# Patient Record
Sex: MALE | URBAN - NONMETROPOLITAN AREA
[De-identification: names, ages, dates, MRNs, and addresses within clinical notes are randomized per-mention and may not be internally consistent; named-entity substitution may affect disease eponyms.]

---

## 2023-08-16 ENCOUNTER — TELEPHONE (OUTPATIENT)
Dept: INTERNAL MEDICINE | Age: 83
End: 2023-08-16

## 2023-08-17 ENCOUNTER — OUTSIDE SERVICES (OUTPATIENT)
Dept: INTERNAL MEDICINE | Age: 83
End: 2023-08-17

## 2023-08-17 DIAGNOSIS — I48.91 ATRIAL FIBRILLATION, UNSPECIFIED TYPE (HCC): ICD-10-CM

## 2023-08-17 DIAGNOSIS — E78.5 HYPERLIPIDEMIA, UNSPECIFIED HYPERLIPIDEMIA TYPE: ICD-10-CM

## 2023-08-17 DIAGNOSIS — F03.A0 MILD DEMENTIA WITHOUT BEHAVIORAL DISTURBANCE, PSYCHOTIC DISTURBANCE, MOOD DISTURBANCE, OR ANXIETY, UNSPECIFIED DEMENTIA TYPE (HCC): ICD-10-CM

## 2023-08-17 DIAGNOSIS — I25.10 CORONARY ARTERY DISEASE INVOLVING NATIVE HEART WITHOUT ANGINA PECTORIS, UNSPECIFIED VESSEL OR LESION TYPE: ICD-10-CM

## 2023-08-17 DIAGNOSIS — I10 HYPERTENSION, UNSPECIFIED TYPE: ICD-10-CM

## 2023-08-17 DIAGNOSIS — K57.92 DIVERTICULITIS: Primary | ICD-10-CM

## 2023-08-17 DIAGNOSIS — M19.90 ARTHRITIS: ICD-10-CM

## 2023-08-17 DIAGNOSIS — J41.0 SIMPLE CHRONIC BRONCHITIS (HCC): ICD-10-CM

## 2023-08-17 DIAGNOSIS — F32.A DEPRESSION, UNSPECIFIED DEPRESSION TYPE: ICD-10-CM

## 2023-08-17 ASSESSMENT — ENCOUNTER SYMPTOMS
WHEEZING: 0
COLOR CHANGE: 0
EYE PAIN: 0
DIARRHEA: 0
CONSTIPATION: 0
VOMITING: 0
SINUS PRESSURE: 0
CHEST TIGHTNESS: 0
RHINORRHEA: 0
FACIAL SWELLING: 0
TROUBLE SWALLOWING: 0
BLOOD IN STOOL: 0
SORE THROAT: 0
COUGH: 0
SHORTNESS OF BREATH: 0
ABDOMINAL PAIN: 0
NAUSEA: 0

## 2023-08-18 NOTE — PROGRESS NOTES
08/17/23  Keanupadmini Silva  1940    Patient Resident of [facility]    Chief Complaint  1. Diverticulitis    2. Hypertension, unspecified type    3. Coronary artery disease involving native heart without angina pectoris, unspecified vessel or lesion type    4. Hyperlipidemia, unspecified hyperlipidemia type    5. Depression, unspecified depression type    6. Simple chronic bronchitis ([address])    7. Arthritis    8. Atrial fibrillation, unspecified type ([address])    9. Mild dementia without behavioral disturbance, psychotic disturbance, mood disturbance, or anxiety, unspecified dementia type [facility])        HPI:  80year-old patient with recent hospitalization at [facility] 8/4/2023 through 8/16/2023 through duodenal diverticulitis. Patient's primary PCP is Tamy Evans through [facility] in [facility]. Patient states he lives in [place] with his granddaughter. States he was transferred to [facility] after hospitalization so he could be closer to his daughter that lives here in [facility]. He states he is unsure why he is at the [facility]. States prior to his hospitalization he was getting up moving around and walking on his own. He does have a chronic dual indwelling Webber catheter due to BPH. Follows with Dr. Rita Dobson urology in [place]. States he has been taking care of that on his own. He denies any pain no shortness of breath. No family present today. Nursing staff state he has been doing well. Review of chart is a DNR. He feels he is doing well mood has been stable history of A-fib continues on Xarelto daily. Continues on atenolol. Regular rate and rhythm on exam.  Feels his mood has been stable on the Zoloft. He denies any diarrhea no nausea no abdominal pain. Ate breakfast today without difficulty. Nursing staff deny any acute nursing service issues      Allergies   Allergen Reactions    Codeine     Keflex [Cephalexin]     Lisinopril     Penicillins        No past medical history on file.     No

## 2023-08-23 ENCOUNTER — HOSPITAL ENCOUNTER (OUTPATIENT)
Age: 83
Setting detail: SPECIMEN
Discharge: HOME | End: 2023-08-23

## 2023-08-23 LAB
ANION GAP SERPL CALCULATED.3IONS-SCNC: 8 MMOL/L (ref 9–17)
BUN SERPL-MCNC: 8 MG/DL (ref 8–23)
BUN/CREAT SERPL: 9 (ref 9–20)
CALCIUM SERPL-MCNC: 8.7 MG/DL (ref 8.6–10.4)
CHLORIDE SERPL-SCNC: 106 MMOL/L (ref 98–107)
CO2 SERPL-SCNC: 24 MMOL/L (ref 20–31)
CREAT SERPL-MCNC: 0.9 MG/DL (ref 0.7–1.2)
ERYTHROCYTE [DISTWIDTH] IN BLOOD BY AUTOMATED COUNT: 13.3 % (ref 11.8–14.4)
GFR SERPL CREATININE-BSD FRML MDRD: >60 ML/MIN/1.73M2
GLUCOSE SERPL-MCNC: 94 MG/DL (ref 70–99)
HCT VFR BLD AUTO: 38.9 % (ref 40.7–50.3)
HGB BLD-MCNC: 13.1 G/DL (ref 13–17)
MCH RBC QN AUTO: 32.7 PG (ref 25.2–33.5)
MCHC RBC AUTO-ENTMCNC: 33.7 G/DL (ref 25.2–33.5)
MCV RBC AUTO: 97 FL (ref 82.6–102.9)
NRBC BLD-RTO: 0 PER 100 WBC
PLATELET # BLD AUTO: 248 K/UL (ref 138–453)
PMV BLD AUTO: 10.8 FL (ref 8.1–13.5)
POTASSIUM SERPL-SCNC: 4.2 MMOL/L (ref 3.7–5.3)
RBC # BLD AUTO: 4.01 M/UL (ref 4.21–5.77)
SODIUM SERPL-SCNC: 138 MMOL/L (ref 135–144)
WBC OTHER # BLD: 5.3 K/UL (ref 3.5–11.3)

## 2023-08-23 PROCEDURE — 80048 BASIC METABOLIC PNL TOTAL CA: CPT

## 2023-08-23 PROCEDURE — 36415 COLL VENOUS BLD VENIPUNCTURE: CPT

## 2023-08-23 PROCEDURE — 85027 COMPLETE CBC AUTOMATED: CPT

## 2023-08-27 PROCEDURE — 99305 1ST NF CARE MODERATE MDM 35: CPT | Performed by: INTERNAL MEDICINE

## 2023-08-30 ENCOUNTER — OUTSIDE SERVICES (OUTPATIENT)
Dept: INTERNAL MEDICINE | Age: 83
End: 2023-08-30
Payer: MEDICARE

## 2023-08-30 DIAGNOSIS — I25.10 CORONARY ARTERY DISEASE INVOLVING NATIVE HEART WITHOUT ANGINA PECTORIS, UNSPECIFIED VESSEL OR LESION TYPE: ICD-10-CM

## 2023-08-30 DIAGNOSIS — E78.5 HYPERLIPIDEMIA, UNSPECIFIED HYPERLIPIDEMIA TYPE: ICD-10-CM

## 2023-08-30 DIAGNOSIS — I48.91 ATRIAL FIBRILLATION, UNSPECIFIED TYPE (HCC): ICD-10-CM

## 2023-08-30 DIAGNOSIS — F03.A0 MILD DEMENTIA WITHOUT BEHAVIORAL DISTURBANCE, PSYCHOTIC DISTURBANCE, MOOD DISTURBANCE, OR ANXIETY, UNSPECIFIED DEMENTIA TYPE (HCC): ICD-10-CM

## 2023-08-30 DIAGNOSIS — I10 HYPERTENSION, UNSPECIFIED TYPE: ICD-10-CM

## 2023-08-30 DIAGNOSIS — K57.92 DIVERTICULITIS: Primary | ICD-10-CM

## 2023-08-30 PROCEDURE — 99315 NF DSCHRG MGMT 30 MIN/LESS: CPT | Performed by: NURSE PRACTITIONER

## 2023-08-30 RX ORDER — LEVOFLOXACIN 750 MG/1
750 TABLET ORAL DAILY
Qty: 6 TABLET | Refills: 0 | Status: SHIPPED | OUTPATIENT
Start: 2023-08-30 | End: 2023-09-05

## 2023-08-30 NOTE — TELEPHONE ENCOUNTER
Francheska is requesting Levaquin 750 mg daily for 6 days to [facility]. He will be 6 days short on his treatment unless this is done. Pended for you.

## 2023-08-31 NOTE — PROGRESS NOTES
08/30/23  Alec Lopez  1940    Patient Resident of [facility]    Chief Complaint  1. Diverticulitis    2. Hypertension, unspecified type    3. Coronary artery disease involving native heart without angina pectoris, unspecified vessel or lesion type    4. Hyperlipidemia, unspecified hyperlipidemia type    5. Atrial fibrillation, unspecified type ([address])    6. Mild dementia without behavioral disturbance, psychotic disturbance, mood disturbance, or anxiety, unspecified dementia type [facility])        HPI:  80year-old patient with recent hospitalization at [facility] 8/4/2023 through 8/16/2023 through duodenal diverticulitis. Patient's primary PCP is Avelina Rizvi through [employer] in [facility]. Patient states he lives in [place] with his granddaughter. States he was transferred to [facility] after hospitalization so he could be closer to his daughter that lives here in [place]. He states he is unsure why he is at the [facility]. States prior to his hospitalization he was getting up moving around and walking on his own. He does have a chronic dual indwelling Webber catheter due to BPH. Follows with Dr. Demetrius Piedra urology in [facility]    In the interim. Patient has done well with physical therapy and plans are for him to be discharging back home with his granddaughter on Monday. His vitals have been stable he has had a good appetite. Nursing staff deny any nursing service issues regarding discharging home. Social service states he will need walker. Allergies   Allergen Reactions    Codeine     Keflex [Cephalexin]     Lisinopril     Penicillins        No past medical history on file. No past surgical history on file.     Medications as per [facility] Chart /reviewed     Social History     Socioeconomic History    Marital status: Unknown     Spouse name: Not on file    Number of children: Not on file    Years of education: Not on file    Highest education level: Not on file   Occupational

## 2023-09-04 ASSESSMENT — ENCOUNTER SYMPTOMS
TROUBLE SWALLOWING: 0
COUGH: 0
BLOOD IN STOOL: 0
WHEEZING: 0
DIARRHEA: 0
SINUS PRESSURE: 0
CHEST TIGHTNESS: 0
FACIAL SWELLING: 0
EYE PAIN: 0
RHINORRHEA: 0
CONSTIPATION: 0
SHORTNESS OF BREATH: 0
SORE THROAT: 0
ABDOMINAL PAIN: 0
NAUSEA: 0
COLOR CHANGE: 0
VOMITING: 0

## 2023-11-15 ENCOUNTER — OUTSIDE SERVICES (OUTPATIENT)
Dept: INTERNAL MEDICINE | Age: 83
End: 2023-11-15
Payer: MEDICARE

## 2023-11-15 DIAGNOSIS — I48.0 PAROXYSMAL ATRIAL FIBRILLATION (HCC): ICD-10-CM

## 2023-11-15 DIAGNOSIS — E78.2 MIXED HYPERLIPIDEMIA: ICD-10-CM

## 2023-11-15 DIAGNOSIS — I10 ESSENTIAL HYPERTENSION: ICD-10-CM

## 2023-11-15 DIAGNOSIS — K57.00 PERFORATED DIVERTICULUM OF DUODENUM: ICD-10-CM

## 2023-11-15 DIAGNOSIS — I25.10 CORONARY ARTERY DISEASE INVOLVING NATIVE HEART WITHOUT ANGINA PECTORIS, UNSPECIFIED VESSEL OR LESION TYPE: ICD-10-CM

## 2023-11-15 DIAGNOSIS — F03.A0 MILD DEMENTIA WITHOUT BEHAVIORAL DISTURBANCE, PSYCHOTIC DISTURBANCE, MOOD DISTURBANCE, OR ANXIETY, UNSPECIFIED DEMENTIA TYPE (HCC): ICD-10-CM

## 2023-11-15 DIAGNOSIS — N40.1 BENIGN PROSTATIC HYPERPLASIA WITH URINARY OBSTRUCTION: ICD-10-CM

## 2023-11-15 DIAGNOSIS — F32.5 MAJOR DEPRESSIVE DISORDER WITH SINGLE EPISODE, IN REMISSION (HCC): ICD-10-CM

## 2023-11-15 DIAGNOSIS — K21.9 GASTROESOPHAGEAL REFLUX DISEASE WITHOUT ESOPHAGITIS: ICD-10-CM

## 2023-11-15 DIAGNOSIS — N13.8 BENIGN PROSTATIC HYPERPLASIA WITH URINARY OBSTRUCTION: ICD-10-CM

## 2023-11-15 DIAGNOSIS — K57.92 DIVERTICULITIS: Primary | ICD-10-CM

## 2023-11-15 DIAGNOSIS — J41.0 SIMPLE CHRONIC BRONCHITIS (HCC): ICD-10-CM

## 2023-11-15 PROBLEM — N21.0 BLADDER STONE: Status: ACTIVE | Noted: 2022-09-12

## 2023-11-15 PROBLEM — M15.0 PRIMARY OSTEOARTHRITIS INVOLVING MULTIPLE JOINTS: Status: ACTIVE | Noted: 2023-11-15

## 2023-11-15 PROBLEM — R33.9 RETENTION OF URINE: Status: ACTIVE | Noted: 2021-01-14

## 2023-11-15 PROBLEM — M25.559 HIP PAIN: Status: ACTIVE | Noted: 2019-11-09

## 2023-11-15 PROBLEM — R10.9 ACUTE ABDOMINAL PAIN: Status: RESOLVED | Noted: 2023-09-05 | Resolved: 2023-11-15

## 2023-11-15 PROBLEM — R97.20 ELEVATED PROSTATE SPECIFIC ANTIGEN (PSA): Status: ACTIVE | Noted: 2021-01-14

## 2023-11-15 PROBLEM — R33.9 RETENTION OF URINE: Status: RESOLVED | Noted: 2021-01-14 | Resolved: 2023-11-15

## 2023-11-15 PROBLEM — R06.09 DYSPNEA ON EXERTION: Status: ACTIVE | Noted: 2022-08-05

## 2023-11-15 PROBLEM — R10.9 ACUTE ABDOMINAL PAIN: Status: ACTIVE | Noted: 2023-09-05

## 2023-11-15 PROBLEM — M15.9 PRIMARY OSTEOARTHRITIS INVOLVING MULTIPLE JOINTS: Status: ACTIVE | Noted: 2023-11-15

## 2023-11-15 PROBLEM — K63.1 BOWEL PERFORATION (HCC): Status: RESOLVED | Noted: 2023-08-04 | Resolved: 2023-11-15
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
## 2023-11-15 PROBLEM — N43.40 SPERMATOCELE: Status: ACTIVE | Noted: 2021-01-14

## 2023-11-15 PROBLEM — K31.6 DUODENAL FISTULA: Status: ACTIVE | Noted: 2023-09-21

## 2023-11-15 PROBLEM — R06.09 DYSPNEA ON EXERTION: Status: RESOLVED | Noted: 2022-08-05 | Resolved: 2023-11-15

## 2023-11-15 PROBLEM — K86.2 PANCREATIC CYST: Status: ACTIVE | Noted: 2023-09-21

## 2023-11-15 PROBLEM — I48.91 ATRIAL FIBRILLATION (HCC): Status: ACTIVE | Noted: 2017-04-28

## 2023-11-15 PROBLEM — N21.0 BLADDER STONE: Status: RESOLVED | Noted: 2022-09-12 | Resolved: 2023-11-15

## 2023-11-15 PROBLEM — K63.1 BOWEL PERFORATION (HCC): Status: ACTIVE | Noted: 2023-08-04

## 2023-11-15 RX ORDER — SERTRALINE HYDROCHLORIDE 100 MG/1
100 TABLET, FILM COATED ORAL DAILY
COMMUNITY

## 2023-11-15 RX ORDER — DONEPEZIL HYDROCHLORIDE 10 MG/1
10 TABLET, FILM COATED ORAL NIGHTLY
COMMUNITY

## 2023-11-15 RX ORDER — MIRTAZAPINE 15 MG/1
15 TABLET, FILM COATED ORAL NIGHTLY
COMMUNITY

## 2023-11-15 RX ORDER — FINASTERIDE 5 MG/1
5 TABLET, FILM COATED ORAL DAILY
Qty: 30 TABLET | Refills: 5 | COMMUNITY
Start: 2023-09-01 | End: 2024-02-28

## 2023-11-15 RX ORDER — ATENOLOL 50 MG/1
50 TABLET ORAL DAILY
COMMUNITY

## 2023-11-15 RX ORDER — SIMVASTATIN 20 MG
20 TABLET ORAL NIGHTLY
COMMUNITY
Start: 2022-09-14

## 2023-11-15 RX ORDER — POTASSIUM CHLORIDE 20 MEQ/1
10 TABLET, EXTENDED RELEASE ORAL DAILY
COMMUNITY

## 2023-11-15 RX ORDER — PANTOPRAZOLE SODIUM 40 MG/1
40 TABLET, DELAYED RELEASE ORAL DAILY
Qty: 30 TABLET | Refills: 2 | COMMUNITY
Start: 2023-09-21 | End: 2023-12-20

## 2023-11-16 NOTE — PROGRESS NOTES
DR. Adiel Rice - [facility] NEW PATIENT HISTORY & PHYSICAL EXAM    DATE OF SERVICE: 8/27/23    NURSING HOME: [facility]    CHRONIC/ACTIVE PROBLEM LIST:     Patient Active Problem List   Diagnosis    Atrial fibrillation ([address])    Benign essential hypertension    Benign prostatic hyperplasia with urinary obstruction    Duodenal fistula    Elevated prostate specific antigen (PSA)    Extrinsic asthma    Hip pain    Hyperlipidemia    Major depressive disorder with single episode    Other seborrheic keratosis    Pancreatic cyst    Perforated diverticulum of duodenum    Spermatocele    Diverticulitis    Gastroesophageal reflux disease without esophagitis    Primary osteoarthritis involving multiple joints       CHIEF COMPLAINT: Here for rehabilitation and physical therapy    HISTORY OF CHIEF COMPLAINT: This 80 y.o.  male was admitted to the [address] [place] [address] for rehabilitation and physical therapy following recent hospitalization at [facility] for diverticulitis with perforation. Following the hospitalization he was admitted to the nursing home and he has been getting physical therapy. He seems to be doing fairly well with that. He does have a history of atrial fibrillation, for which she is taking Xarelto. He also has dementia, and takes Aricept for that. He uses atenolol for hypertension. He takes Zocor for hyperlipidemia. He is on Protonix for gastroesophageal reflux disease. He takes Zoloft and Remeron for a history of depression. There are no other complaints at this time. ALLERGIES:   Allergies   Allergen Reactions    Codeine     Keflex [Cephalexin]     Lisinopril     Penicillins     Povidone Iodine        MEDICATIONS: As noted on the [facility], referenced and incorporated herein.     PAST MEDICAL HISTORY:   Past Medical History:   Diagnosis Date    Acute abdominal pain 9/5/2023    Atrial fibrillation ([address]) 4/28/2017    Last Assessment & Plan:

## 2024-03-25 ENCOUNTER — HOSPITAL ENCOUNTER (OUTPATIENT)
Age: 84
Setting detail: SPECIMEN
Discharge: HOME | End: 2024-03-25

## 2024-03-25 ENCOUNTER — OUTSIDE SERVICES (OUTPATIENT)
Dept: INTERNAL MEDICINE | Age: 84
End: 2024-03-25
Payer: MEDICARE

## 2024-03-25 DIAGNOSIS — N30.00 ACUTE CYSTITIS WITHOUT HEMATURIA: Primary | ICD-10-CM

## 2024-03-25 DIAGNOSIS — N40.1 BENIGN PROSTATIC HYPERPLASIA WITH URINARY OBSTRUCTION: ICD-10-CM

## 2024-03-25 DIAGNOSIS — R33.9 URINARY RETENTION: ICD-10-CM

## 2024-03-25 DIAGNOSIS — R53.1 GENERAL WEAKNESS: ICD-10-CM

## 2024-03-25 DIAGNOSIS — N13.8 BENIGN PROSTATIC HYPERPLASIA WITH URINARY OBSTRUCTION: ICD-10-CM

## 2024-03-25 DIAGNOSIS — I48.0 PAROXYSMAL ATRIAL FIBRILLATION (HCC): ICD-10-CM

## 2024-03-25 DIAGNOSIS — I10 BENIGN ESSENTIAL HYPERTENSION: ICD-10-CM

## 2024-03-25 LAB
ANION GAP SERPL CALCULATED.3IONS-SCNC: 10 MMOL/L (ref 9–17)
BASOPHILS # BLD: 0.06 K/UL (ref 0–0.2)
BASOPHILS NFR BLD: 1 % (ref 0–2)
BUN SERPL-MCNC: 10 MG/DL (ref 8–23)
BUN/CREAT SERPL: 13 (ref 9–20)
CALCIUM SERPL-MCNC: 8.5 MG/DL (ref 8.6–10.4)
CHLORIDE SERPL-SCNC: 106 MMOL/L (ref 98–107)
CO2 SERPL-SCNC: 23 MMOL/L (ref 20–31)
CREAT SERPL-MCNC: 0.8 MG/DL (ref 0.7–1.2)
EOSINOPHIL # BLD: 0.15 K/UL (ref 0–0.44)
EOSINOPHILS RELATIVE PERCENT: 3 % (ref 1–4)
ERYTHROCYTE [DISTWIDTH] IN BLOOD BY AUTOMATED COUNT: 12.9 % (ref 11.8–14.4)
GFR SERPL CREATININE-BSD FRML MDRD: >60 ML/MIN/1.73M2
GLUCOSE SERPL-MCNC: 85 MG/DL (ref 70–99)
HCT VFR BLD AUTO: 38.7 % (ref 40.7–50.3)
HGB BLD-MCNC: 13.2 G/DL (ref 13–17)
IMM GRANULOCYTES # BLD AUTO: <0.03 K/UL (ref 0–0.3)
IMM GRANULOCYTES NFR BLD: 0 %
LYMPHOCYTES NFR BLD: 0.76 K/UL (ref 1.1–3.7)
LYMPHOCYTES RELATIVE PERCENT: 15 % (ref 24–43)
MCH RBC QN AUTO: 32.7 PG (ref 25.2–33.5)
MCHC RBC AUTO-ENTMCNC: 34.1 G/DL (ref 25.2–33.5)
MCV RBC AUTO: 95.8 FL (ref 82.6–102.9)
MONOCYTES NFR BLD: 0.46 K/UL (ref 0.1–1.2)
MONOCYTES NFR BLD: 9 % (ref 3–12)
NEUTROPHILS NFR BLD: 72 % (ref 36–65)
NEUTS SEG NFR BLD: 3.71 K/UL (ref 1.5–8.1)
NRBC BLD-RTO: 0 PER 100 WBC
PLATELET # BLD AUTO: 312 K/UL (ref 138–453)
PMV BLD AUTO: 10.1 FL (ref 8.1–13.5)
POTASSIUM SERPL-SCNC: 4.3 MMOL/L (ref 3.7–5.3)
RBC # BLD AUTO: 4.04 M/UL (ref 4.21–5.77)
SODIUM SERPL-SCNC: 139 MMOL/L (ref 135–144)
WBC OTHER # BLD: 5.2 K/UL (ref 3.5–11.3)

## 2024-03-25 PROCEDURE — 80048 BASIC METABOLIC PNL TOTAL CA: CPT

## 2024-03-25 PROCEDURE — 85025 COMPLETE CBC W/AUTO DIFF WBC: CPT

## 2024-03-25 PROCEDURE — 36415 COLL VENOUS BLD VENIPUNCTURE: CPT

## 2024-03-25 PROCEDURE — 99309 SBSQ NF CARE MODERATE MDM 30: CPT | Performed by: NURSE PRACTITIONER

## 2024-03-26 ASSESSMENT — ENCOUNTER SYMPTOMS
CHEST TIGHTNESS: 0
NAUSEA: 0
SORE THROAT: 0
COUGH: 0
ABDOMINAL PAIN: 0
TROUBLE SWALLOWING: 0
COLOR CHANGE: 0
RHINORRHEA: 0
SHORTNESS OF BREATH: 0
CONSTIPATION: 0
VOMITING: 0
EYE PAIN: 0
WHEEZING: 0
DIARRHEA: 0
SINUS PRESSURE: 0
BLOOD IN STOOL: 0
FACIAL SWELLING: 0

## 2024-03-26 NOTE — PROGRESS NOTES
03/25/24  Cy Osmun  1940    Patient Resident of [facility]    Chief Complaint  1. Acute cystitis without hematuria    2. Urinary retention    3. Benign prostatic hyperplasia with urinary obstruction    4. Benign essential hypertension    5. Paroxysmal atrial fibrillation (HCC)    6. General weakness        HPI:  83-year-old patient with recent hospitalization at [facility] where he was admitted 3/14/2024 for UTI, urinary retention, dark stools, history of A-fib on Xarelto.  Fall, inability to care for himself in the home setting.  Patient was treated with antibiotics, suggested nursing home for further skilled therapy.  Initially had abdominal pain.  That resolved.  CT of abdomen pelvis showed catheter present with balloon distended into the prostate.  Webber catheter was repositioned.  No further concerns of GI bleed.  Webber catheter was exchanged.  Working properly.  Has follow-up with urology for monthly exchanges.  CT of abdomen showed severe jejunitis GI was consulted recommended conservative measures.  Eliquis was held during hospital stay however has been resumed.  Was subsequently discharged to [facility] on 3/19/2024 for further eval and treatment by PT OT.  Discharged on levofloxacin for completion of 7-day course.  Xarelto restarted.  No further dark stools.  No change in urine.  Vitals have been stable.  Patient pleasant on exam.  Declines any acute concerns.  Nursing staff deny any acute concerns.      Allergies   Allergen Reactions    Codeine     Keflex [Cephalexin]     Lisinopril     Penicillins     Povidone Iodine        Past Medical History:   Diagnosis Date    Acute abdominal pain 9/5/2023    Atrial fibrillation (HCC) 4/28/2017    Last Assessment & Plan:  Formatting of this note might be different from the original. 11/9/2019 History of paroxysmal A.Fib. On Coumadin. Did not take his dose on 11/8/19. INR 1.7.  -Will hold home Coumadin for now pending MRI results. -If MRI

## 2024-03-29 ENCOUNTER — HOSPITAL ENCOUNTER (OUTPATIENT)
Age: 84
Setting detail: SPECIMEN
Discharge: HOME | End: 2024-03-29

## 2024-03-29 LAB
ANION GAP SERPL CALCULATED.3IONS-SCNC: 8 MMOL/L (ref 9–17)
BUN SERPL-MCNC: 12 MG/DL (ref 8–23)
BUN/CREAT SERPL: 13 (ref 9–20)
CALCIUM SERPL-MCNC: 8.4 MG/DL (ref 8.6–10.4)
CHLORIDE SERPL-SCNC: 107 MMOL/L (ref 98–107)
CO2 SERPL-SCNC: 24 MMOL/L (ref 20–31)
CREAT SERPL-MCNC: 0.9 MG/DL (ref 0.7–1.2)
ERYTHROCYTE [DISTWIDTH] IN BLOOD BY AUTOMATED COUNT: 13.2 % (ref 11.8–14.4)
GFR SERPL CREATININE-BSD FRML MDRD: 85 ML/MIN/1.73M2
GLUCOSE SERPL-MCNC: 81 MG/DL (ref 70–99)
HCT VFR BLD AUTO: 40 % (ref 40.7–50.3)
HGB BLD-MCNC: 13.8 G/DL (ref 13–17)
MCH RBC QN AUTO: 32.8 PG (ref 25.2–33.5)
MCHC RBC AUTO-ENTMCNC: 34.5 G/DL (ref 25.2–33.5)
MCV RBC AUTO: 95 FL (ref 82.6–102.9)
NRBC BLD-RTO: 0 PER 100 WBC
PLATELET # BLD AUTO: 297 K/UL (ref 138–453)
PMV BLD AUTO: 10.3 FL (ref 8.1–13.5)
POTASSIUM SERPL-SCNC: 4 MMOL/L (ref 3.7–5.3)
RBC # BLD AUTO: 4.21 M/UL (ref 4.21–5.77)
SODIUM SERPL-SCNC: 139 MMOL/L (ref 135–144)
WBC OTHER # BLD: 4.8 K/UL (ref 3.5–11.3)

## 2024-03-29 PROCEDURE — 80048 BASIC METABOLIC PNL TOTAL CA: CPT

## 2024-03-29 PROCEDURE — 36415 COLL VENOUS BLD VENIPUNCTURE: CPT

## 2024-03-29 PROCEDURE — 85027 COMPLETE CBC AUTOMATED: CPT

## 2024-04-01 ENCOUNTER — OUTSIDE SERVICES (OUTPATIENT)
Dept: INTERNAL MEDICINE | Age: 84
End: 2024-04-01
Payer: MEDICARE

## 2024-04-01 DIAGNOSIS — N30.00 ACUTE CYSTITIS WITHOUT HEMATURIA: Primary | ICD-10-CM

## 2024-04-01 DIAGNOSIS — I48.0 PAROXYSMAL ATRIAL FIBRILLATION (HCC): ICD-10-CM

## 2024-04-01 DIAGNOSIS — R53.1 GENERAL WEAKNESS: ICD-10-CM

## 2024-04-01 DIAGNOSIS — F03.A0 MILD DEMENTIA WITHOUT BEHAVIORAL DISTURBANCE, PSYCHOTIC DISTURBANCE, MOOD DISTURBANCE, OR ANXIETY, UNSPECIFIED DEMENTIA TYPE (HCC): ICD-10-CM

## 2024-04-01 DIAGNOSIS — I10 BENIGN ESSENTIAL HYPERTENSION: ICD-10-CM

## 2024-04-01 DIAGNOSIS — I25.10 CORONARY ARTERY DISEASE INVOLVING NATIVE HEART WITHOUT ANGINA PECTORIS, UNSPECIFIED VESSEL OR LESION TYPE: ICD-10-CM

## 2024-04-01 DIAGNOSIS — R33.9 URINARY RETENTION: ICD-10-CM

## 2024-04-01 PROCEDURE — 99315 NF DSCHRG MGMT 30 MIN/LESS: CPT | Performed by: NURSE PRACTITIONER

## 2024-04-01 ASSESSMENT — ENCOUNTER SYMPTOMS
VOMITING: 0
RHINORRHEA: 0
WHEEZING: 0
EYE PAIN: 0
CHEST TIGHTNESS: 0
SINUS PRESSURE: 0
DIARRHEA: 0
SORE THROAT: 0
BLOOD IN STOOL: 0
COUGH: 0
COLOR CHANGE: 0
CONSTIPATION: 0
NAUSEA: 0
TROUBLE SWALLOWING: 0
SHORTNESS OF BREATH: 0
ABDOMINAL PAIN: 0
FACIAL SWELLING: 0

## 2024-04-01 NOTE — PROGRESS NOTES
04/01/24  Cy Osmun  1940    Patient Resident of [facility]    Chief Complaint  1. Acute cystitis without hematuria    2. Urinary retention    3. Benign essential hypertension    4. Paroxysmal atrial fibrillation (HCC)    5. General weakness    6. Coronary artery disease involving native heart without angina pectoris, unspecified vessel or lesion type    7. Mild dementia without behavioral disturbance, psychotic disturbance, mood disturbance, or anxiety, unspecified dementia type (HCC)        HPI:  83-year-old patient with recent hospitalization at [facility] where he was admitted 3/14/2024 for UTI, urinary retention, dark stools, history of A-fib on Xarelto. Fall, inability to care for himself in the home setting. Patient was treated with antibiotics, suggested nursing home for further skilled therapy. Initially had abdominal pain. That resolved. CT of abdomen pelvis showed catheter present with balloon distended into the prostate. Webber catheter was repositioned. No further concerns of GI bleed. Webber catheter was exchanged. Working properly. Has follow-up with urology for monthly exchanges. CT of abdomen showed severe jejunitis GI was consulted recommended conservative measures. Eliquis was held during hospital stay however has been resumed. Was subsequently discharged to [facility] on 3/19/2024 for further eval and treatment by PT OT. Discharged on levofloxacin for completion of 7-day course. Xarelto restarted. No further dark stools. No change in urine.     In the interim  Patient being seen for discharge needs as insurance cut him.  States he appealed however they denied the appeal.  Planning on being discharged home with home health.  Is followed by his granddaughter.  He states he is up ambulating with physical therapy.  Feeling stronger.  Is ready to go home.  He denies any problems with bowels.  States he had a good normal BM this morning.  Does continue with a Webber catheter which

## 2025-07-17 PROBLEM — T83.511A URINARY TRACT INFECTION ASSOCIATED WITH INDWELLING URETHRAL CATHETER: Status: ACTIVE | Noted: 2024-03-14

## 2025-07-17 PROBLEM — N31.9 NEUROGENIC BLADDER: Status: ACTIVE | Noted: 2024-02-14

## 2025-07-17 PROBLEM — N39.0 URINARY TRACT INFECTION ASSOCIATED WITH INDWELLING URETHRAL CATHETER: Status: ACTIVE | Noted: 2024-03-14

## 2025-07-17 PROBLEM — F03.90 DEMENTIA (HCC): Status: ACTIVE | Noted: 2024-03-19

## 2025-07-17 RX ORDER — HYOSCYAMINE SULFATE 0.12 MG/1
0.12 TABLET SUBLINGUAL EVERY 4 HOURS PRN
COMMUNITY

## 2025-07-17 RX ORDER — METOPROLOL SUCCINATE 25 MG/1
25 TABLET, EXTENDED RELEASE ORAL DAILY
COMMUNITY

## 2025-07-17 RX ORDER — MORPHINE SULFATE 100 MG/5ML
5 SOLUTION ORAL
COMMUNITY

## 2025-07-17 RX ORDER — SENNOSIDES 8.6 MG/1
1 TABLET ORAL EVERY 12 HOURS
COMMUNITY

## 2025-07-17 RX ORDER — LORAZEPAM 0.5 MG/1
0.5 TABLET ORAL EVERY 8 HOURS PRN
COMMUNITY